# Patient Record
(demographics unavailable — no encounter records)

---

## 2019-06-14 NOTE — XRAY REPORT
Reason:  EPIGASTRIC PAIN

Procedure Date:  06/14/2019   

Accession Number:  [id number] / [id number]                    

Procedure:  XR  - Abdomen Acute CPT Code:  

 

FULL RESULT:

 

 

EXAM:

ABDOMINAL SERIES AND PA CHEST

 

EXAM DATE: 6/14/2019 12:53 PM.

 

CLINICAL HISTORY: EPIGASTRIC PAIN.

 

COMPARISON:  02/15/2016 10:42 PM

CHEST 1 VIEW 08/31/2015 2:37 AM.

 

TECHNIQUE: 2 views abdomen and 1 view chest.

 

FINDINGS:

CHEST:

Lungs/Pleura: No focal opacities. No effusion or pneumothorax.

 

Mediastinum: Within exam limitations, cardiomediastinal contour is normal.

 

ABDOMEN:

Bowel Gas Pattern: Within normal limits.  No dilated loops or abnormal 

fluid levels.

 

Free Air: None.

 

Other: Left iliac vascular stent. S-shaped thoracolumbar scoliosis

 

IMPRESSION: Unremarkable abdominal series (including 1-view chest).

 

[facility]

## 2019-06-15 NOTE — ED PHYSICIAN DOCUMENTATION
PD HPI ABD PAIN





- Stated complaint


Stated Complaint: ABD PX





- Chief complaint


Chief Complaint: Abd Pain





- History obtained from


History obtained from: Patient, Family





- History of Present Illness


Timing - onset: How many days ago (several)


Timing - duration: Days (several)


Timing - details: Abrupt onset


Pain level max: 7


Pain level now: 6


Quality: Aching, Dull, Pain


Location: Epigastric


Radiation: No: Chest, , Lower back, Left flank, Left shoulder, Right flank, 

Right shoulder, Upper back


Improved by: Other (nothing)


Worsened by: Other (nothing)


Associated symptoms: No: Fever, Nausea, Vomiting, Hematemesis, Diarrhea, 

Constipation, Melena, Hematochezia, Dysuria, Hematuria, Chest pain, Dizzy, Near 

syncope / syncope, Loss of appetite, Weight loss, Vaginal bleeding, Vaginal dc


Recently seen: Clinic (seen in clinic yesterday for same, normal acute abdominal

series)





Review of Systems


Unable to obtain: Dementia


Constitutional: denies: Fever, Chills


Throat: denies: Sore throat


Cardiac: denies: Chest pain / pressure


Respiratory: denies: Cough, Wheezing


GI: denies: Vomiting, Diarrhea, Hematemesis, Bloody / black stool


: denies: Dysuria


Skin: denies: Rash


Musculoskeletal: denies: Neck pain, Back pain


Neurologic: denies: Headache





PD PAST MEDICAL HISTORY





- Past Medical History


Cardiovascular: High cholesterol, Coronary artery disease, Peripheral Vascular 

Disease


Respiratory: Asthma


Endocrine/Autoimmune: None


GI: GERD, Hiatal hernia


GYN: None


: None


HEENT: Glaucoma


Psych: Anxiety


Musculoskeletal: Osteoporosis


Derm: None





- Past Surgical History


Past Surgical History: Yes


General: Appendectomy


Ortho: Amputation, Other


/GYN: Hysterectomy





- Present Medications


Home Medications: 


                                Ambulatory Orders











 Medication  Instructions  Recorded  Confirmed


 


Potassium Chloride 20 meq PO DAILY 08/31/15 05/17/16


 


Cholecalciferol (Vitamin D3) 1,000 unit PO DAILY 09/01/15 05/17/16





[Vitamin D3]   


 


Aspirin [Adult Low Dose Aspirin EC] 80 mg PO DAILY 02/16/16 05/17/16


 


Polyethylene Glycol 3350 [Miralax] 8.5 - 17 gm PO DAILY 02/16/16 05/17/16


 


ALPRAZolam [Alprazolam] 0.125 mg PO BID 05/17/16 05/17/16


 


Acetaminophen [Tylenol] 650 mg PO Q6H PRN 05/17/16 05/17/16


 


Alprazolam [Alprazolam Odt] 0.125 mg PO TID 05/17/16 05/17/16


 


Famotidine [Pepcid] 20 mg PO BID 05/17/16 05/17/16


 


Gabapentin 300 mg BID 05/17/16 05/17/16


 


Risperidone 1 mg BID 05/17/16 05/17/16


 


Simethicone [Anti-Gas] 60 mg PO PRN 05/17/16 


 


Travoprost (Benzalkonium) 2.5 ml OP 05/17/16 





[Travoprost 0.004% Eye Drop]   


 


oxyCODONE ER [OxyCONTIN] 10 mg PO Q12H 05/17/16 05/17/16


 


Azithromycin [Zithromax] 250 mg PO DAILY #6 tablet 10/09/16 


 


dexAMETHasone [Decadron] 4 mg PO DAILY #5 tablet 10/09/16 


 


Esomeprazole Magnesium [Nexium] 40 mg PO DAILY #30 capsule. 06/15/19 


 


Sucralfate [Carafate] 1 gm PO ACHS #60 tablet 06/15/19 


 


fentaNYL [Fentanyl 12mcg patch] 1 each TD 06/15/19 














- Allergies


Allergies/Adverse Reactions: 


                                    Allergies











Allergy/AdvReac Type Severity Reaction Status Date / Time


 


gentamicin [Gentamicin] Allergy  Unknown Verified 05/16/16 22:22


 


lisinopril Allergy  Unknown Verified 05/16/16 22:22














- Social History


Does the pt smoke?: No


Smoking Status: Former smoker


Does the pt drink ETOH?: No


Does the pt have substance abuse?: No





- Immunizations


Immunizations are current?: Yes





- POLST


Patient has POLST: Yes





PD ED PE NORMAL





- Vitals


Vital signs reviewed: Yes





- General


General: No acute distress, Well developed/nourished, Other (alert, oriented to 

)





- HEENT


HEENT: Moist mucous membranes





- Neck


Neck: Supple, no meningeal sign





- Cardiac


Cardiac: RRR, Strong equal pulses





- Respiratory


Respiratory: No respiratory distress, Clear bilaterally





- Abdomen


Abdomen: Normal bowel sounds, Soft, Non tender, Non distended





- Derm


Derm: Warm and dry





- Extremities


Extremities: Other (B AKA, no infection on skin)





- Neuro


Neuro: Other (alert)





- Psych


Psych: Normal mood, Normal affect





Results





- Vitals


Vitals: 


                               Vital Signs - 24 hr











  06/15/19 06/15/19 06/15/19





  12:40 13:00 14:30


 


Temperature 36.8 C  


 


Heart Rate 72  


 


Respiratory 18  





Rate   


 


Blood Pressure 121/93 H  


 


O2 Saturation 88 L 96 93














  06/15/19





  15:02


 


Temperature 


 


Heart Rate 66


 


Respiratory 18





Rate 


 


Blood Pressure 132/75 H


 


O2 Saturation 93








                                     Oxygen











O2 Source [Without Activity]   Room air


 


O2 Source                      Room air

















- Labs


Labs: 


                                Laboratory Tests











  06/15/19 06/15/19 06/15/19





  13:20 13:43 14:09


 


WBC  6.0  


 


RBC  4.83  


 


Hgb  13.2  


 


Hct  41.5  


 


MCV  85.9  


 


MCH  27.4  


 


MCHC  31.9 L  


 


RDW  16.4 H  


 


Plt Count  608 H  


 


MPV  10.2  


 


Neut # (Auto)  2.1  


 


Lymph # (Auto)  2.8  


 


Mono # (Auto)  0.9  


 


Eos # (Auto)  0.0  


 


Baso # (Auto)  0.2 H  


 


Absolute Nucleated RBC  0.02  


 


Nucleated RBC %  0.3  


 


Manual Slide Review  Indicated  


 


Platelet Morphology  RARE GIANT PLATELETS  


 


Sodium   141 


 


Potassium   4.0 


 


Chloride   109 


 


Carbon Dioxide   23 


 


Anion Gap   9.0 


 


BUN   14 


 


Creatinine   1.0 


 


Estimated GFR (MDRD)   65 L 


 


Glucose   103 H 


 


Calcium   9.5 


 


Total Bilirubin   0.6 


 


AST   28 


 


ALT   11 


 


Alkaline Phosphatase   109 


 


Total Protein   8.6 H 


 


Albumin   3.4 


 


Globulin   5.2 H 


 


Albumin/Globulin Ratio   0.7 L 


 


Lipase   32 


 


Urine Color    YELLOW


 


Urine Clarity    CLEAR


 


Urine pH    5.5


 


Ur Specific Gravity    1.020


 


Urine Protein    NEGATIVE


 


Urine Glucose (UA)    NEGATIVE


 


Urine Ketones    NEGATIVE


 


Urine Occult Blood    NEGATIVE


 


Urine Nitrite    NEGATIVE


 


Urine Bilirubin    NEGATIVE


 


Urine Urobilinogen    0.2 (NORMAL)


 


Ur Leukocyte Esterase    NEGATIVE


 


Ur Microscopic Review    NOT INDICATED


 


Urine Culture Comments    NOT INDICATED














PD MEDICAL DECISION MAKING





- ED course


Complexity details: reviewed old records, reviewed results, re-evaluated 

patient, considered differential, d/w patient, d/w family


ED course: 





Symptoms resolved with GI cocktail and IV Protonix.  Likely that she has 

gastritis from her iron pills.  Tolerating p.o. without difficulty here.  

Hypoxia resolved in the emergency department as well.  She is very well-

appearing, nontoxic.  Afebrile.  No evidence of bowel obstruction.  No 

peritoneal signs.  Patient and family counseled regarding signs and symptoms for

which I believe and urgent re-evaluation would be necessary. Patient with good 

understanding of and agreement to plan and is comfortable going home at this 

time





This document was made in part using voice recognition software. While efforts 

are made to proofread this document, sound alike and grammatical errors may 

occur.





Departure





- Departure


Disposition: 01 Home, Self Care


Clinical Impression: 


Gastritis


Qualifiers:


 Gastritis type: unspecified gastritis Chronicity: acute Gastritis bleeding: 

without bleeding Qualified Code(s): K29.00 - Acute gastritis without bleeding





Condition: Good


Instructions:  ED PUD Vs Gastritis


Follow-Up: 


Paulo Haddad MD [Primary Care Provider] - Within 1 week


Prescriptions: 


Esomeprazole Magnesium [Nexium] 40 mg PO DAILY #30 capsule.


Sucralfate [Carafate] 1 gm PO ACHS #60 tablet


Comments: 


Use the medications as prescribed.  Return if she worsens.  Follow-up with your 

doctor for further care.


Discharge Date/Time: 06/15/19 15:33

## 2019-09-19 NOTE — CT REPORT
Reason:  ABDOMINAL PAIN

Procedure Date:  09/18/2019   

Accession Number:  [id number] / [id number]                    

Procedure:  CT  - Abdomen/Pelvis W CPT Code:  

 

FULL RESULT:

 

 

EXAM:

CT ABDOMEN AND PELVIS

 

EXAM DATE: 9/18/2019 03:15 PM.

 

CLINICAL HISTORY: ABDOMINAL PAIN.

 

COMPARISONS: ABDOMEN ACUTE 06/14/2019 12:39 PM

CHEST 2 VIEW 06/14/2019 12:21 PM

CHEST 2 VIEW PA/LAT 10/09/2016 6:59 PM.

 

TECHNIQUE: Routine helical CT imaging was performed through the abdomen 

and pelvis. IV contrast: OPTI 320 80ML. Enteric contrast: No. 

Reconstructions: Coronal and sagittal.

 

In accordance with CT protocol optimization, one or more of the following 

dose reduction techniques were utilized for this exam: automated exposure 

control, adjustment of mA and/or KV based on patient size, or use of 

iterative reconstructive technique.

 

FINDINGS:

Lung Bases: No pleural effusion

 

Liver: Calcified granulomata. No masses.

 

Gallbladder/Bile Ducts: Unremarkable.

 

Spleen: Post splenectomy. 1.6 cm residual splenule 3/12.

 

Pancreas: Atrophic appearance. Pancreatic duct 2.8 mm

 

Adrenal Glands: Normal.

 

Kidneys: Bilateral renal cysts, the largest 3.3 cm left kidney. 1.2 cm 

round calcification left renal hilus 3/20 , question relationship to left 

renal artery

 

Peritoneal Cavity/Bowel:  No free fluid, free air or adenopathy. No 

masses or acute inflammatory process.

 

Pelvic Organs: Bladder is unremarkable. Uterus not seen.

 

Vasculature: The distal descending thoracic aorta is dilated measuring 

3.8 cm at the diaphragm with moderate mural thrombus. The celiac axis is 

occluded. Superior mesenteric artery patent. Distal 4.4 x 4.1 cm 

abdominal aortic aneurysm extending to the level of the bifurcation where 

bilateral common iliac artery grafts are in place. No evidence of 

periaortic stranding.

 

Bones: Degenerative change in the spine

 

Other: None.

IMPRESSION:

1. 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 

abdominal aorta maximal measurement 4.4 cm  distal abdominal aorta. 

Celiac axis origin appears occluded. Suggest dedicated imaging of the 

thoracic and abdominal aorta for further assessment.

2. Bilateral common iliac artery stent/grafts.

3. Status post splenectomy.

4. Bilateral renal cysts.

5. 1.2 cm round calcification left renal hilus, question relationship  to 

left renal artery.

6. Other incidental findings as above.

 

[facility]

## 2019-09-30 NOTE — CT REPORT
Reason:  ABDOMINAL AORTIC ANEURYSM, THORACIC AORTIC ANEURYS

Procedure Date:  09/29/2019   

Accession Number:  [id number] / [id number]                    

Procedure:  CT  - ANGIO CHEST W/WO CPT Code:  

 

FULL RESULT:

 

 

EXAM:

CT ANGIOGRAM CHEST

 

EXAM DATE: 09/29/2019 11:36 AM.

 

CLINICAL HISTORY: Abdominal aortic aneurysm, thoracic aortic aneurysm.

 

COMPARISON: None.

 

TECHNIQUE: Routine helical imaging was performed through the chest in the 

pulmonary arterial phase. IV Contrast: Optiray 320, 90 mL. 

Reconstructions: Coronal 3-D MIP reconstructions. Sagittal and coronal.

 

In accordance with CT protocol optimization, one or more of the following 

dose reduction techniques were utilized for this exam: automated exposure 

control, adjustment of mA and/or KV based on patient size, or use of 

iterative reconstructive technique.

 

FINDINGS:

Pulmonary Arteries:

Diagnostic quality: Adequate through the segmental arteries. No evidence 

for acute or chronic pulmonary emboli.

 

RV/LV is within normal limits. There is no interventricular septal 

bowing. There is no reflux of contrast material in the IVC.

 

Lungs/Pleura: Mild emphysematous changes with paraseptal and 

centrilobular cystic changes toward the lung apices. No lung 

consolidations identified. No bronchiectasis or pleural fluid collections.

 

Mediastinum: No cardiac enlargement or adenopathy.

 

Thoracic Aorta: Proximal abdominal aorta measured 35 x 33 mm, the aortic 

arch measured 31 x 32 mm, the mid descending thoracic aorta 28 x 29 mm, 

and the distal abdominal aorta 34 x 35 mm. Calcified mural plaques most 

conspicuous of the ascending aorta, aortic arch, and proximal descending 

aorta. Mural thickening most conspicuous of the mid to distal descending 

thoracic aorta. No aortic dissection identified.

 

Upper Abdomen: Reported separately.

 

Other: None.

IMPRESSION: Thoracic aortic prominence, with significant mural thickening 

distally, as above. No dissection identified.

 

[facility]

## 2019-09-30 NOTE — CT REPORT
Reason:  ABDOMINAL AORTIC ANEURYSM, THORACIC AORTIC ANEURYS

Procedure Date:  09/29/2019   

Accession Number:  [id number] / [id number]                    

Procedure:  CT  - ANGIO PELVIS W/WO CPT Code:  

 

FULL RESULT:

 

 

EXAM:

CT PELVIS

 

EXAM DATE: 9/29/2019 11:36 AM.

 

CLINICAL HISTORY: Abdominal aortic aneurysm, thoracic aortic aneurysm.

 

COMPARISONS: None.

 

TECHNIQUE: Routine helical CT imaging was performed through the pelvis. 

IV contrast: OPTI 320 90 mL. Enteric contrast: No. Reconstructions: 

Coronal and sagittal.

 

In accordance with CT protocol optimization, one or more of the following 

dose reduction techniques were utilized for this exam: automated exposure 

control, adjustment of mA and/or KV based on patient size, or use of 

iterative reconstructive technique.

 

FINDINGS:

Visualized Abdominal Organs: Normal.

 

Peritoneal Cavity/Bowel: No bowel obstruction identified. [place] 

radiopaque densities at the cecum, possibly pills. The appendix is not 

well seen.

 

Pelvic Organs: Uterus absent. No cystic or mass lesions in either adnexa. 

Normal-appearing urinary bladder..

 

Vasculature: Abdominal aorta findings reported separately. No aneurysm 

identified of the right common iliac artery. The left common iliac artery 

appears occluded. A vascular stent is seen. No aneurysm of the left 

common iliac artery identified.

 

Bones: No significant abnormality.

 

Other: None.

IMPRESSION: The left common iliac artery appears occluded.

 

[facility]

## 2019-09-30 NOTE — CT REPORT
Reason:  ABDOMINAL AORTIC ANEURYSM, THORACIC AORTIC ANEURYS

Procedure Date:  09/29/2019   

Accession Number:  [id number] / [id number]                    

Procedure:  CT  - ANGIO ABDOMEN W/WO CPT Code:  

 

FULL RESULT:

 

 

EXAM:

CTA ABDOMEN AND PELVIS

 

INDICATION: Thoracic and abdominal aortic aneurysm.

 

TECHNIQUE: Following intravenous administration of Optiray 320, 90 mL, 

axial sections were obtained through the abdomen and pelvis. Multiplanar 

3D reconstructions are available for interpretation.

 

In accordance with CT protocol optimization, one or more of the following 

dose reduction techniques were utilized for this exam: automated exposure 

control, adjustment of mA and/or KV based on patient size, or use of 

iterative reconstructive technique.

 

COMPARISON: CHEST ANGIO 09/29/2019 11:19 AM.

ABDOMEN ANGIO 09/29/2019 11:19 AM.

ABDOMEN/PELVIS W/ 09/18/2019 2:55 PM.

 

FINDINGS:

 

MEASUREMENTS:

At the level of the diaphragm, the aorta measures 32 x 35 mm. Previous 30 

x 34 mm.

Above the renal arteries, the aorta measures 25 x 28 mm. Previous 22 x 27 

mm.

Below the renal arteries, the aorta measures 39 x 43 mm. Previous 40 x 44 

mm.

Above the iliac bifurcation, the aorta measures 25 x 30 mm. Previous 24 x 

27 mm.

 

Lower thoracic aorta: 40 mm, stable.

 

ABDOMINAL AORTA: Moderately heavy mural calcification without dissection, 

similar to previous.

 

MESENTERIC ARTERIES: Celiac artery appears occluded at its origin. More 

distal aspect filled by collaterals. The SMA appears patent. The renal 

arteries appear patent. The GILBERTO is not seen.

 

CT ABDOMEN AND PELVIS: Visualized lung bases appear clear. No focal 

lesions detected in the visualized liver, pancreas, or adrenal glands. 

1.6 cm splenule indicated left upper abdomen. The spleen is not otherwise 

seen. Stable appearance of bilateral renal cysts. The kidneys enhance 

symmetrically. No pathologically enlarged lymph nodes.

IMPRESSION: Overall stable appearance of lower thoracic and abdominal 

aortic aneurysm. Heavy atheromatous changes. No dissection identified. 

The celiac artery appears to be occluded at its takeoff. The GILBERTO takeoff 

is also not seen.

 

[facility]

## 2019-10-13 NOTE — HISTORY & PHYSICAL EXAMINATION
Chief Complaint





- Chief Complaint


Chief Complaint: generalized weakness





<Ashwin Brice - Last Filed: 10/13/19 19:52>





History of Present Illness





- Admitted From


Admitted From:: [facility] ED





- History Obtained From


Records Reviewed: yes


History obtained from: patient's 





<Ashwin Brice - Last Filed: 10/13/19 19:52>





- History of Present Illness


HPI Comment/Other: 


Patient seen on 10/13/19 at 07:00am


Patient is an 79 y/o female with medical history significant PVD s/p bilateral 

BKA, Hyperlipidemia, asthma, mild dementia, GERD with hiatal hernia, glaucoma, 

anxiety, osteoporosis and iron deficiency anemia who presents to the ED with 

worsening weakness over the past couple days. Normally she is able to put on her

prostheses and get to the bathroom using a walker. She has not been able to do 

so lately. Yesterday she was not able to transfer to a bedside commode even with

the aid of her . She was too weak to turn. She was prescribed diamox for 

glaucoma 2 weeks ago. She has also been having a very poor appetite. As a result

she presented to the ED. Preliminary labs drawn showed a sodium of 146 and a 

chloride of 122. She was given 2L of fluid and repeat labs showed slightly more 

elevated sodium and chloride and decreased potassium. As a result she was 

presented for admission for further correction of her electrolyte imbalance.


At bedside, the patient appears very sleepy. Her  points out that she has

been sleeping longer. She was readily arousable to verbal stimuli and answered 

my questions. She denied chest pain, CHAD, abd pain, fever or chills.


She was instructed to discontinue her fentanyl patch last week by her PCP. She 

is also on lyrica and xanax.


 (Ashwin Brice)





History





- Past Medical History


Cardiovascular: reports: High cholesterol, Coronary artery disease, Peripheral 

Vascular Disease


Respiratory: reports: Asthma


Endocrine/Autoimmune: reports: None


GI: reports: GERD, Hiatal hernia


GYN: reports: None


: reports: None


HEENT: reports: Glaucoma


Psych: reports: Anxiety


Musculoskeletal: reports: Osteoporosis


Derm: reports: None


MRSA Hx?: No





- Past Surgical History


General: reports: Appendectomy


Ortho: reports: Amputation, Other


/GYN: reports: Hysterectomy





- Family & Social History


Family History: Father: CAD


Social History Notes: She lives with her  in [place]. They have been 

 for 58 years. She had a 1ppd X 15 yrs now down to 1 or 2 cigarettes 

daily. The patient denies any alcohol or illicit drug use





- POLST


Patient has POLST: Yes





<Ashwin Brice F - Last Filed: 10/13/19 19:52>





Meds/Allgy





<Jackie Murdock - Last Filed: 10/13/19 10:21>





<Ashwin Brice F - Last Filed: 10/13/19 19:52>





- Home Medications


Home Medications: 


                                Ambulatory Orders











 Medication  Instructions  Recorded  Confirmed


 


ALPRAZolam [Alprazolam] 0.25 mg PO BID 05/17/16 10/13/19


 


Baclofen 10 mg PO TID PRN 10/12/19 10/13/19


 


Divalproex Sodium 125 mg PO BID 10/12/19 10/13/19


 


Esomeprazole Magnesium 40 mg PO QDAC 10/12/19 10/13/19


 


Lovastatin 80 mg PO QPM 10/12/19 10/13/19


 


Mirtazapine 7.5 mg PO QPM 10/12/19 10/13/19


 


Potassium Chloride 20 meq PO DAILY 10/12/19 10/13/19


 


ALPRAZolam [Alprazolam] 0.25 mg PO BID 10/13/19 10/13/19


 


Aspirin [Aspirin EC] 81 mg PO DAILY 10/13/19 10/13/19


 


Brimonidine 0.2% Ophth Drops 1 drops RIGHTEYE BID 10/13/19 10/13/19





[Alphagan P 0.2% Ophth Drops]   


 


Dorzolamide HCl/Timolol Maleat 1 drops RIGHTEYE BID 10/13/19 10/13/19





[Dorzolamide-Timolol Eye Drops]   


 


Latanoprost/Pf [Latanoprost 0.005% 1 drops EACHEYE QPM 10/13/19 10/13/19





Eye Drop]   


 


Lidocaine Patch 5% [Lidoderm Patch] 2 patch TOP QPM 10/13/19 10/13/19


 


Megestrol Acetate 10 ml PO DAILY 10/13/19 10/13/19


 


Pregabalin 50 mg PO BID PRN 10/13/19 10/13/19














- Allergies


Allergies/Adverse Reactions: 


                                    Allergies











Allergy/AdvReac Type Severity Reaction Status Date / Time


 


gentamicin [Gentamicin] Allergy  Unknown Verified 05/16/16 22:22


 


lisinopril Allergy  Unknown Verified 05/16/16 22:22














Review of Systems





- Constitutional


Constitutional: reports: Weakness, Poor appetite.  denies: Fatigue, Fever





- Eyes


Eyes: reports: Other (glaucoma in the right eye).  denies: Blurred vision, 

Dipolpia





- Ears, Nose & Throat


Ears, Nose & Throat: denies: Nasal pain, Sore throat, Hoarseness





- Cardiovascular


Cariovascular: denies: Chest pain, Edema, Exertional dyspnea, Decr. exercise 

tolerance





- Respiratory


Respiratory: denies: Cough, Wheezing, SOB at rest, SOB with exertion





- Gastrointestinal


Gastrointestinal: denies: Abdominal pain, Abdominal distention, Constipation, 

Nausea, Vomiting





- Musculoskeletal


Musculoskeletal: denies: Muscle pain, Stiffness





- Integumentary


Integumentary: denies: Rash, Dryness, Pigment changes, Nail changes





- Neurological


Neurological: reports: General weakness.  denies: Focal weakness





- Psychiatric


Psychiatric: reports: Depression, Anxiety





- Endocrine


Endocrine: denies: Polyuria, Polydypsia





- Hematologic/Lymphatic


Hematologic/Lymphatic: denies: Anemia, Bruising





<Ashwin Brice F - Last Filed: 10/13/19 19:52>





<Ashwin Brice F - Last Filed: 10/13/19 19:52>


Prior Level of Functionality: 


Patient lives with  in their house. She is a double amputee. Usually she 

is able to ambulate using her prostheses and a walker. However she has been very

 weak lately. She also has poor appetite for which she was prescribed megestrol.

  She normally has a caregiver who comes in for 4-5 hours 5days a week. 


 (NcSara patel F)





Exam





- Physical Exam


General Appearance: positive: No acute distress, Other (Somnolent)


Eyes Bilateral: positive: Normal inspection, PERRL, EOMI


ENT: positive: ENT inspection nml, Pharynx nml


Neck: positive: Nml inspection, No JVD, Trachea midline


Respiratory: positive: Chest non-tender, No respiratory distress, Breath sounds 

nml.  negative: Wheezes, Rales, Rhonchi


Cardiovascular: positive: Regular rate & rhythm


Abdomen: positive: Non-tender, No organomegaly, Nml bowel sounds, No distention.

  negative: Guarding, Rebound, Hepatomegaly


Back: positive: Nml inspection


Skin: positive: Color nml, No rash, Warm, Dry


Extremities: positive: Other (bilateral BKA)


Neurologic/Psychiatric: positive: Oriented x3 (but lethargic), Weakness





<Nchotu,Niba F - Last Filed: 10/13/19 19:52>





- Vital Signs


Vital Signs: 





                                Vital Signs x48h











  Temp Pulse Resp BP Pulse Ox


 


 10/13/19 09:20     130/103 H  95


 


 10/13/19 09:06   64  18  118/65  96


 


 10/13/19 08:30   64  18  123/73  98


 


 10/13/19 07:34   66  14  128/73  99


 


 10/13/19 07:22  37.2 C  66  14  137/75 H  99


 


 10/13/19 06:45   69  18  143/83 H  99


 


 10/13/19 05:12  37.1 C    


 


 10/13/19 04:00   70  16  148/82 H  99


 


 10/13/19 02:33   75  16  127/72  97














Conclusion/Plan





- Lab Results


Fish Bones: 


                                 10/13/19 00:55





                                 10/13/19 06:20





<Jackie Murdock - Last Filed: 10/13/19 10:21>





- Problem List


(1) Electrolyte imbalance


Conclusion/Plan: 


Hypernatremia:


Diamox discontinued


D5+half NS ordered at 125ml/hr.


Will recheck BMP 





Hyperchloremia


D5+half NS ordered at 125ml/hr.


Will recheck BMP





Hypokalemia


Will replace with 60mEq of potassium citrate 








(2) Weakness


Conclusion/Plan: 


Possibly multifactorial


?2/2 Electrolyte imbalance, Medication, Poor oral intake


Hydrating patient and replacing potassium


Patient on megestrol for appetite. Will continue


Will hold lyrica, baclofen and alprazolam


Patient had fentanyl patch discontinued 4 days ago.





Social work to see patient and assist with an appropriate disposition.


The patient normally has caregivers for 4-5hr during weak days











(3) Glaucoma


Conclusion/Plan: 


On latanoprost, dorzolamide/ timolol.


Will continue when verified








(4) Hyperlipidemia


Conclusion/Plan: 


On lovastatin








(5) GERD (gastroesophageal reflux disease)


Conclusion/Plan: 


Protonix ordered








(6) Depression


Conclusion/Plan: 


On remeron








- Lab Results


Fish Bones: 


                                 10/13/19 00:55





                                 10/13/19 06:20





<Ashwin Brice F - Last Filed: 10/13/19 19:52>





Core Measures





- Anticipated LOS


I expect patient to be DC'd or transferred within 96 hours.: Yes





- DVT/VTE - Prophylaxis


VTE/DVT Device ordered at admit?: Yes





<Ashwin Brice F - Last Filed: 10/13/19 19:52>

## 2019-10-13 NOTE — ED PHYSICIAN DOCUMENTATION
History of Present Illness





- Stated complaint


Stated Complaint: WEAKNESS





- Chief complaint


Chief Complaint: Neuro





- History obtained from


History obtained from: Patient, Family, EMS





- History of Present Illness


Timing: How many days ago (2)





- Additonal information


Additional information: 





80-year-old female with history of severe peripheral vascular disease who is 

status post bilateral BKA and has a history of vascular dementia is being cared 

for by her  at home.  Over the past 2 weeks the patient has been placed 

on Diamox for elevated pressure in the right eye.  She has become profoundly 

weak and the patient's  is unable to care for her at home.  She is 

normally able to sit up, put her prostheses on and transfer to the bedside 

commode.  She is not able to sit up in bed today.





Review of Systems


Constitutional: denies: Fever


Eyes: reports: Decreased vision


Ears: denies: Ear pain


Nose: denies: Congestion


Throat: denies: Sore throat


Cardiac: denies: Chest pain / pressure, Palpitations


Respiratory: denies: Dyspnea, Cough


GI: reports: Abdominal Pain.  denies: Nausea, Vomiting


: denies: Dysuria, Frequency


Skin: denies: Rash


Musculoskeletal: reports: Back pain.  denies: Neck pain


Neurologic: reports: Generalized weakness.  denies: Focal weakness, Numbness





PD PAST MEDICAL HISTORY





- Past Medical History


Past Medical History: Yes


Cardiovascular: High cholesterol, Coronary artery disease, Peripheral Vascular 

Disease


Respiratory: Asthma


Endocrine/Autoimmune: None


GI: GERD, Hiatal hernia


GYN: None


: None


HEENT: Glaucoma


Psych: Anxiety


Musculoskeletal: Osteoporosis


Derm: None





- Past Surgical History


Past Surgical History: Yes


General: Appendectomy


Ortho: Amputation, Other


/GYN: Hysterectomy





- Present Medications


Home Medications: 


                                Ambulatory Orders











 Medication  Instructions  Recorded  Confirmed


 


Potassium Chloride 20 meq PO DAILY 08/31/15 05/17/16


 


Cholecalciferol (Vitamin D3) 1,000 unit PO DAILY 09/01/15 05/17/16





[Vitamin D3]   


 


Aspirin [Adult Low Dose Aspirin EC] 80 mg PO DAILY 02/16/16 05/17/16


 


Polyethylene Glycol 3350 [Miralax] 8.5 - 17 gm PO DAILY 02/16/16 05/17/16


 


ALPRAZolam [Alprazolam] 0.25 mg PO BID 05/17/16 05/17/16


 


Acetaminophen [Tylenol] 650 mg PO Q6H PRN 05/17/16 05/17/16


 


Alprazolam [Alprazolam Odt] 0.25 mg PO RTQ4H 05/17/16 05/17/16


 


Famotidine [Pepcid] 20 mg PO BID 05/17/16 05/17/16


 


Gabapentin 300 mg BID 05/17/16 05/17/16


 


Risperidone 1 mg BID 05/17/16 05/17/16


 


Simethicone [Anti-Gas] 60 mg PO PRN 05/17/16 


 


Travoprost (Benzalkonium) 2.5 ml OP 05/17/16 





[Travoprost 0.004% Eye Drop]   


 


oxyCODONE ER [OxyCONTIN] 10 mg PO Q12H 05/17/16 05/17/16


 


Esomeprazole Magnesium [Nexium] 40 mg PO DAILY #30 capsule. 06/15/19 


 


Sucralfate [Carafate] 1 gm PO ACHS #60 tablet 06/15/19 


 


Baclofen 10 mg PO TID PRN 10/12/19 


 


Divalproex Sodium 125 mg PO BID 10/12/19 


 


Esomeprazole Magnesium 40 mg PO DAILY 10/12/19 


 


Lovastatin 80 mg PO DAILY 10/12/19 


 


Mirtazapine 0.5 tab PO DAILY 10/12/19 


 


Potassium Chloride 20 meq PO DAILY 10/12/19 














- Allergies


Allergies/Adverse Reactions: 


                                    Allergies











Allergy/AdvReac Type Severity Reaction Status Date / Time


 


gentamicin [Gentamicin] Allergy  Unknown Verified 05/16/16 22:22


 


lisinopril Allergy  Unknown Verified 05/16/16 22:22














- Social History


Does the pt smoke?: No


Smoking Status: Never smoker


Does the pt drink ETOH?: No


Does the pt have substance abuse?: No





- Immunizations


Immunizations are current?: Yes





- POLST


Patient has POLST: Yes





PD ED PE NORMAL





- Vitals


Vital signs reviewed: Yes (hypertensive mild )





- General


General: No acute distress, Well developed/nourished, Other (oriented to person 

only )





- HEENT


HEENT: Atraumatic





- Neck


Neck: Supple, no meningeal sign, No bony TTP





- Cardiac


Cardiac: RRR, No murmur





- Respiratory


Respiratory: No respiratory distress, Clear bilaterally





- Abdomen


Abdomen: Soft, Non tender





- Back


Back: No CVA TTP, No spinal TTP





- Derm


Derm: Normal color, Warm and dry, No rash





- Extremities


Extremities: Other (bilat  BKA)





- Neuro


Neuro: CNs 2-12 intact, No motor deficit, No sensory deficit, Normal speech


Eye Opening: Spontaneous


Motor: Obeys Commands


Verbal: Confused


GCS Score: 14





- Psych


Psych: Other (mood is withdrawn and affect is flat)





Results





- Vitals


Vitals: 


                               Vital Signs - 24 hr











  10/12/19 10/13/19 10/13/19





  23:05 01:12 02:33


 


Temperature 36.8 C  


 


Heart Rate 78 82 75


 


Respiratory 16 15 16





Rate   


 


Blood Pressure 124/81 H 108/62 127/72


 


O2 Saturation 99 94 97














  10/13/19 10/13/19 10/13/19





  04:00 05:12 06:45


 


Temperature  37.1 C 


 


Heart Rate 70  69


 


Respiratory 16  18





Rate   


 


Blood Pressure 148/82 H  143/83 H


 


O2 Saturation 99  99














  10/13/19





  07:22


 


Temperature 37.2 C


 


Heart Rate 66


 


Respiratory 14





Rate 


 


Blood Pressure 137/75 H


 


O2 Saturation 99








                                     Oxygen











O2 Source []                   Room air


 


O2 Source                      Room air

















- Labs


Labs: 


                                Laboratory Tests











  10/13/19 10/13/19 10/13/19





  00:55 01:13 02:20


 


WBC  7.0  


 


RBC  4.35  


 


Hgb  12.1  


 


Hct  39.8  


 


MCV  91.5  


 


MCH  27.8  


 


MCHC  30.4 L  


 


RDW  20.6 H  


 


Plt Count  240  


 


MPV  12.0 H  


 


Neut # (Auto)  3.3  


 


Lymph # (Auto)  3.0  


 


Mono # (Auto)  0.7  


 


Eos # (Auto)  0.0  


 


Baso # (Auto)  0.0  


 


Absolute Nucleated RBC  0.05  


 


Nucleated RBC %  0.7  


 


Manual Slide Review  Indicated  


 


WBC Morphology  NORMAL APPEARANCE  


 


Platelet Estimate  NORMAL (130-450,000)  


 


Platelet Morphology  PLATELET CLUMPING  


 


RBC Morph Micro Appear  2+  ANISOCYTOSIS  


 


Sodium    146 H


 


Potassium    3.5


 


Chloride    122 H*


 


Carbon Dioxide    16 L


 


Anion Gap    8.0


 


BUN    13


 


Creatinine    1.0


 


Estimated GFR (MDRD)    65 L


 


Glucose    82


 


Calcium    8.7


 


Total Bilirubin    0.7


 


AST    18


 


ALT    < 10 L


 


Alkaline Phosphatase    95


 


Total Protein    8.1


 


Albumin    2.8 L


 


Globulin    5.3 H


 


Albumin/Globulin Ratio    0.5 L


 


Lipase    30


 


Urine Color   YELLOW 


 


Urine Clarity   CLEAR 


 


Urine pH   5.5 


 


Ur Specific Gravity   1.020 


 


Urine Protein   NEGATIVE 


 


Urine Glucose (UA)   NEGATIVE 


 


Urine Ketones   NEGATIVE 


 


Urine Occult Blood   NEGATIVE 


 


Urine Nitrite   NEGATIVE 


 


Urine Bilirubin   NEGATIVE 


 


Urine Urobilinogen   1 (NORMAL) 


 


Ur Leukocyte Esterase   SMALL H 


 


Urine RBC   None Seen 


 


Urine WBC   4-5 


 


Ur Squamous Epith Cells   FEW Squamous 


 


Urine Bacteria   Few 


 


Ur Microscopic Review   INDICATED 


 


Urine Culture Comments   INDICATED 














  10/13/19





  06:20


 


WBC 


 


RBC 


 


Hgb 


 


Hct 


 


MCV 


 


MCH 


 


MCHC 


 


RDW 


 


Plt Count 


 


MPV 


 


Neut # (Auto) 


 


Lymph # (Auto) 


 


Mono # (Auto) 


 


Eos # (Auto) 


 


Baso # (Auto) 


 


Absolute Nucleated RBC 


 


Nucleated RBC % 


 


Manual Slide Review 


 


WBC Morphology 


 


Platelet Estimate 


 


Platelet Morphology 


 


RBC Morph Micro Appear 


 


Sodium  147 H


 


Potassium  3.2 L


 


Chloride  124 H*


 


Carbon Dioxide  17 L


 


Anion Gap  6.0


 


BUN  11


 


Creatinine  0.9


 


Estimated GFR (MDRD)  73 L


 


Glucose  89


 


Calcium  8.4 L


 


Total Bilirubin 


 


AST 


 


ALT 


 


Alkaline Phosphatase 


 


Total Protein 


 


Albumin 


 


Globulin 


 


Albumin/Globulin Ratio 


 


Lipase 


 


Urine Color 


 


Urine Clarity 


 


Urine pH 


 


Ur Specific Gravity 


 


Urine Protein 


 


Urine Glucose (UA) 


 


Urine Ketones 


 


Urine Occult Blood 


 


Urine Nitrite 


 


Urine Bilirubin 


 


Urine Urobilinogen 


 


Ur Leukocyte Esterase 


 


Urine RBC 


 


Urine WBC 


 


Ur Squamous Epith Cells 


 


Urine Bacteria 


 


Ur Microscopic Review 


 


Urine Culture Comments 














- Rads (name of study)


  ** chest


Radiology: Prelim report reviewed (Impression:Stable appearance of the chest 

without acute cardiopulmonary ), EMP read indepedently, See rad report





Procedures





- IVC sono (time)


  ** 2320


Bedside IVC sono: IVC measures (cm) (0.71), IVC collapsed c insp (cm) 

(complete), Profound dehydration (est 3 liter deficit)





PD MEDICAL DECISION MAKING





- ED course


Complexity details: reviewed old records, reviewed results, re-evaluated 

patient, considered differential, d/w patient, d/w family


ED course: 





80-year-old female with severe peripheral vascular disease and vascular dementia

 has had a problem with pressure in her right eye and has been placed on some 

Diamox and she has become profoundly dehydrated.  She has become weak enough 

that her  is unable to care for her at home she is able to sit up in bed 

by herself and she is brought to the hospital for further evaluation.  Here in 

the emergency department the patient is administered intravenous saline and she 

is given 2 L and despite this her strength does not return immediately.  She is 

still too weak to sit up in bed. She will require more fluid over a longer 

period of time to compensate. She has advanced peripheral vascular disease and 

her recovery may be prolonged or worse. I did discuss with her  the 

possibility that she would not recover and he indicates that there are advance 

directives indicating DNR.  We attempted to admit this patient to the hospital 

and she will need further care in the ED. Her initial electrolytes were obtained

 after 2 liters of saline were administered and showed hypernatremia. Her fluid 

is changed to D5W at 200ml/hr and the  is consulted. At shift 

change care is turned over to Dr. Murdock. 





Departure





- Departure


Clinical Impression: 


 Dehydration, Altered mental status, Weakness





Condition: Poor

## 2019-10-13 NOTE — XRAY REPORT
Reason:  weakness

Procedure Date:  10/13/2019   

Accession Number:  [id number] / [id number]                    

Procedure:  XR  - Chest 1 View X-Ray CPT Code:  11992

 

FULL RESULT:

 

 

EXAM:

CHEST RADIOGRAPHY

 

EXAM DATE: 10/12/2019 11:45 PM.

 

CLINICAL HISTORY: Weakness.

 

COMPARISON: CHEST 2 VIEW 06/14/2019 12:21 PM

CHEST 2 VIEW PA/LAT 10/09/2016 6:59 PM

 02/15/2016 6:18 PM

CHEST 1 VIEW 08/31/2015 2:37 AM

CHEST ANGIO 09/29/2019 11:21 AM.

 

TECHNIQUE: 1 view.

 

FINDINGS:

Lungs/Pleura: No significant consolidation, effusion, or definite 

pneumothorax.

 

Mediastinum: Small calcified left hilar lymph node. Cardiac silhouette is 

within normal limits when accounting for lung volumes and technique.

 

Other: None.

 

IMPRESSION: Stable appearance of the chest without acute cardiopulmonary 

abnormality.

 

RADIA

## 2019-10-13 NOTE — XRAY REPORT
Reason:  chest pain

Procedure Date:  10/13/2019   

Accession Number:  [id number] / [id number]                    

Procedure:  XR  - Chest 1 View X-Ray CPT Code:  62780

 

FULL RESULT:

 

 

EXAM:

CHEST RADIOGRAPHY

 

EXAM DATE: 10/13/2019 10:23 AM.

 

CLINICAL HISTORY: Chest pain.

 

COMPARISON: CHEST 1 VIEW 10/12/2019 11:45 PM

CHEST ANGIO 09/29/2019 11:21 AM.

 

TECHNIQUE: 1 view.

 

FINDINGS:

Lungs/Pleura: No focal opacities evident. No pleural effusion. No 

pneumothorax.

 

Mediastinum: Tortuous partially calcified aorta is seen. Slight 

aneurysmal dilatation of the descending portion is again noted.

 

Other: None.

 

IMPRESSION: No acute findings.

 

RADIA

## 2019-10-14 NOTE — PROVIDER PROGRESS NOTE
Assessment/Plan





- Problem List


(1) UTI (urinary tract infection)


Qualifiers: 


   Urinary tract infection type: acute cystitis   Hematuria presence: without 

hematuria   Qualified Code(s): N30.00 - Acute cystitis without hematuria   


Assessment/Plan: 


The admission urinalysis was abnormal and culture was indicated.


The preliminary urine culture is already growing gram-negative bacteria.


A UTI may have been the reason for the progressive weakness over the past 

several days.


Will change her from Observation to full Inpatient admission, she will need at 

least 2 days of IV antibiotics.








(2) Wheezing


Assessment/Plan: 


The patient's chart indicates that she has asthma, and the patient nods her head

Yes when I asked her; the  however did not know this diagnosis.


We will start nebulizers.


We will order BNP and stop her IV fluids








(3) Dehydration


Assessment/Plan: 


Improved, her oral mucosa is moist, labs have normalized








(4) Weakness


Assessment/Plan: 


The  reports that she is still not at her baseline of being able to put 

on her prostheses herself, get out of bed herself and ambulate


Start PT today


SW to see regarding more hours of home assistance possibly








(5) S/P BKA (below knee amputation) bilateral


Assessment/Plan: 


Chronic and well tolerated








(6) Dementia


Assessment/Plan: 


The  reports that her Dx is "Vascular Dementia" but that normally she is 

very functional, plays along with TV game shows even.








(7) Hypokalemia


Assessment/Plan: 


Related to saline and free water hydration without K.


Replace K 


Follow BMP daily








(8) Hypernatremia


Assessment/Plan: 


Resolved








- Current Meds


Current Meds: 





                               Current Medications











Generic Name Dose Route Start Last Admin





  Trade Name Freq  PRN Reason Stop Dose Admin


 


Albuterol/Ipratropium  3 ml  10/14/19 11:22  10/14/19 11:43





  Duoneb  INH   3 ml





  Q4HR PRN   Administration





  Wheezing   





     





     





     


 


Aspirin  81 mg  10/14/19 12:00  10/14/19 13:45





  Ecotrin  PO   81 mg





  DAILY KATHY   Administration





     





     





     





     


 


Megestrol Acetate  400 mg  10/14/19 12:00  10/14/19 13:45





  Megace  PO   400 mg





  DAILY KATHY   Administration





     





     





     





     


 


Pantoprazole Sodium  40 mg  10/13/19 08:00  10/14/19 06:09





  Protonix  PO   40 mg





  QDAC KATHY   Administration





     





     





     





     


 


Brimonidine 0.2%  1 each  10/13/19 21:00  10/14/19 21:08





  Ophthalmic Drops  RIGHTEYE   1 each





  BID KATHY   Administration





     





     





     





     


 


Dorzolamide 2%/  1 each  10/13/19 21:00  10/14/19 21:15





Timolol 0.5%  RIGHTEYE   1 each





Ophthalmic Drops  BID KATHY   Administration





     





     





     





     


 


Latanoprost 0.005%  1 each  10/13/19 21:00  10/14/19 21:20





  Ophthalmic Drops  EACHEYE   1 each





  QPM KATHY   Administration





     





     





     





     


 


Divalproex Dr 125 Mg  1 each  10/14/19 13:00  10/14/19 21:12





  Tablet  PO   1 each





  BID KATHY   Administration





     





     





     





     


 


Polyethylene Glycol  17 gm  10/13/19 09:00  10/14/19 08:13





  Miralax  PO   Not Given





  DAILY KATHY   





     





     





     





     


 


Sodium Chloride  10 ml  10/13/19 09:00  10/14/19 16:26





  Normal Saline Flush 0.9%  IVP   10 ml





  0100,0900,1700 KATHY   Administration





     





     





     





     














- Lab Result


Fish Bone Diagrams: 


                                 10/15/19 07:26





                                 10/15/19 07:26





- Additional Planning


My Orders: 





My Active Orders





10/13/19 21:00


Patient Own Med [Patient Own Medication]   1 each EACHEYE QPM 


Patient Own Med [Patient Own Medication]   1 each RIGHTEYE BID 


Patient Own Med [Patient Own Medication]   1 each RIGHTEYE BID 





10/14/19


Evaluate and Treat OT [OT] Routine 


Evaluate and Treat PT [PT] Routine 





10/14/19 11:09


Baclofen [Lioresal]   10 mg PO TID PRN 





10/14/19 11:22


Nebulizer/MDI Tx. [RC] .Q4 PRN 


Ipratropium/Albuterol [Duoneb]   3 ml INH Q4HR PRN 





10/14/19 12:00


Aspirin EC [Ecotrin]   81 mg PO DAILY 


Megestrol [Megace]   400 mg PO DAILY 





10/14/19 13:00


Patient Own Med [Patient Own Medication]   1 each PO BID 














Subjective





- Subjective


Patient Reports: Resting Comfortably


Nursing Reports: Confused





Objective


Vital Signs: 





                               Vital Signs - 24 hr











  10/13/19 10/14/19 10/14/19





  23:30 03:20 07:28


 


Temperature 36.7 C 36.9 C 38.0 C H


 


Heart Rate   


 


Heart Rate [ 66 68 107 H





Brachial]   


 


Heart Rate [   





Supine]   


 


Respiratory 16 16 20





Rate   


 


Respiratory   





Rate [Without   





Activity]   


 


Blood Pressure   





[Left Brachial   





artery]   


 


Blood Pressure 137/76 H 155/78 H 





[Right Brachial   





artery]   


 


Blood Pressure   174/144 H





[Right Radial   





artery]   


 


O2 Saturation 93 98 84 L


 


O2 Saturation [   





Without   





Activity]   














  10/14/19 10/14/19 10/14/19





  08:51 11:43 15:00


 


Temperature   37.4 C


 


Heart Rate  89 


 


Heart Rate [   81





Brachial]   


 


Heart Rate [   





Supine]   


 


Respiratory  20 11 L





Rate   


 


Respiratory   





Rate [Without   





Activity]   


 


Blood Pressure   153/71 H





[Left Brachial   





artery]   


 


Blood Pressure 144/103 H  





[Right Brachial   





artery]   


 


Blood Pressure   





[Right Radial   





artery]   


 


O2 Saturation   96


 


O2 Saturation [   





Without   





Activity]   














  10/14/19 10/14/19 10/14/19





  15:35 16:37 20:01


 


Temperature 37.4 C  37.3 C


 


Heart Rate   


 


Heart Rate [ 79  79





Brachial]   


 


Heart Rate [  76 





Supine]   


 


Respiratory 16  16





Rate   


 


Respiratory  16 





Rate [Without   





Activity]   


 


Blood Pressure   





[Left Brachial   





artery]   


 


Blood Pressure   





[Right Brachial   





artery]   


 


Blood Pressure 146/107 H  133/83 H





[Right Radial   





artery]   


 


O2 Saturation 97  96


 


O2 Saturation [  86 L 





Without   





Activity]   








                                     Oxygen











O2 Source [Without Activity]   Room air


 


O2 Source                      Room air














I&O (Last 24 Hrs): 





                          Intake and Output Totals x24h











 10/12/19 10/13/19 10/14/19





 23:59 23:59 23:59


 


Intake Total  4280 3000


 


Output Total  750 3100


 


Balance  3530 -100











General: Alert


HEENT: Mucous membr. moist/pink


Neck: Supple, No JVD


Neuro: Disoriented


Cardiovascular: Regular rate, No murmurs


Respiratory: No respiratory distress, Breath sounds nml


Abdomen: Soft


Extremities: Other (Bilat BKAs)





- Results


Results: 





                               Laboratory Results











WBC  7.4 x10^3/uL (4.8-10.8)   10/14/19  05:29    


 


RBC  4.91 10^6/uL (4.20-5.40)   10/14/19  05:29    


 


Hgb  13.4 g/dL (12.0-16.0)   10/14/19  05:29    


 


Hct  43.3 % (37.0-47.0)   10/14/19  05:29    


 


MCV  88.2 fL (81.0-99.0)   10/14/19  05:29    


 


MCH  27.3 pg (27.0-31.0)   10/14/19  05:29    


 


MCHC  30.9 g/dL (32.0-36.0)  L  10/14/19  05:29    


 


RDW  20.6 % (12.0-15.0)  H  10/14/19  05:29    


 


Plt Count  316 10^3/uL (130-450)   10/14/19  05:29    


 


MPV  11.8 fL (7.9-10.8)  H  10/14/19  05:29    


 


Neut # (Auto)  3.7 10^3/uL (1.5-6.6)   10/14/19  05:29    


 


Lymph # (Auto)  2.7 10^3/uL (1.5-3.5)   10/14/19  05:29    


 


Mono # (Auto)  0.6 10^3/uL (0.0-1.0)   10/14/19  05:29    


 


Eos # (Auto)  0.2 10^3/uL (0.0-0.7)   10/14/19  05:29    


 


Baso # (Auto)  0.0 10^3/uL (0.0-0.1)   10/14/19  05:29    


 


Absolute Nucleated RBC  0.05 x10^3/uL  10/14/19  05:29    


 


Nucleated RBC %  0.7 /100WBC  10/14/19  05:29    


 


Manual Slide Review  Indicated   10/13/19  00:55    


 


WBC Morphology  NORMAL APPEARANCE  (NORMAL)   10/13/19  00:55    


 


Platelet Estimate  NORMAL (130-450,000)  (NORMAL)   10/13/19  00:55    


 


Platelet Morphology  PLATELET CLUMPING  (NORMAL)   10/13/19  00:55    


 


RBC Morph Micro Appear  2+  ANISOCYTOSIS  (NORMAL)   10/13/19  00:55    


 


Sodium  143 mmol/L (135-145)   10/14/19  05:29    


 


Potassium  4.0 mmol/L (3.5-5.0)   10/14/19  05:29    


 


Chloride  116 mmol/L (101-111)  H  10/14/19  05:29    


 


Carbon Dioxide  18 mmol/L (21-32)  L  10/14/19  05:29    


 


Anion Gap  9.0  (6-13)   10/14/19  05:29    


 


BUN  5 mg/dL (6-20)  L  10/14/19  05:29    


 


Creatinine  0.8 mg/dL (0.4-1.0)   10/14/19  05:29    


 


Estimated GFR (MDRD)  84  (>89)  L  10/14/19  05:29    


 


Glucose  115 mg/dL ()  H  10/14/19  05:29    


 


Calcium  9.2 mg/dL (8.5-10.3)   10/14/19  05:29    


 


Phosphorus  2.8 mg/dL (2.5-4.6)   10/13/19  06:25    


 


Magnesium  1.8 mg/dL (1.7-2.8)   10/13/19  06:25    


 


Total Bilirubin  0.7 mg/dL (0.2-1.0)   10/13/19  02:20    


 


AST  18 IU/L (10-42)   10/13/19  02:20    


 


ALT  < 10 IU/L (10-60)  L  10/13/19  02:20    


 


Alkaline Phosphatase  95 IU/L ()   10/13/19  02:20    


 


B-Natriuretic Peptide  258 pg/mL (5-100)  H  10/14/19  06:00    


 


Total Protein  8.1 g/dL (6.7-8.2)   10/13/19  02:20    


 


Albumin  2.8 g/dL (3.2-5.5)  L  10/13/19  02:20    


 


Globulin  5.3 g/dL (2.1-4.2)  H  10/13/19  02:20    


 


Albumin/Globulin Ratio  0.5  (1.0-2.2)  L  10/13/19  02:20    


 


Lipase  30 U/L (22-51)   10/13/19  02:20    


 


Urine Color  YELLOW   10/13/19  01:13    


 


Urine Clarity  CLEAR  (CLEAR)   10/13/19  01:13    


 


Urine pH  5.5 PH (5.0-7.5)   10/13/19  01:13    


 


Ur Specific Gravity  1.020  (1.002-1.030)   10/13/19  01:13    


 


Urine Protein  NEGATIVE mg/dL (NEGATIVE)   10/13/19  01:13    


 


Urine Glucose (UA)  NEGATIVE mg/dL (NEGATIVE)   10/13/19  01:13    


 


Urine Ketones  NEGATIVE mg/dL (NEGATIVE)   10/13/19  01:13    


 


Urine Occult Blood  NEGATIVE  (NEGATIVE)   10/13/19  01:13    


 


Urine Nitrite  NEGATIVE  (NEGATIVE)   10/13/19  01:13    


 


Urine Bilirubin  NEGATIVE  (NEGATIVE)   10/13/19  01:13    


 


Urine Urobilinogen  1 (NORMAL) E.U./dL (NORMAL)   10/13/19  01:13    


 


Ur Leukocyte Esterase  SMALL  (NEGATIVE)  H  10/13/19  01:13    


 


Urine RBC  None Seen /HPF (0-5)   10/13/19  01:13    


 


Urine WBC  4-5 /HPF (0-5)   10/13/19  01:13    


 


Ur Squamous Epith Cells  FEW Squamous  (<= Few)   10/13/19  01:13    


 


Urine Bacteria  Few /HPF (None Seen)   10/13/19  01:13    


 


Ur Microscopic Review  INDICATED   10/13/19  01:13    


 


Urine Culture Comments  INDICATED   10/13/19  01:13    














- Procedures


Procedures: 





Procedures





CENTRAL VENOUS CATHETER PLACEMENT WITH GUIDANCE (08/31/15)


SPINAL TAP (08/31/15)

## 2019-10-15 NOTE — DISCHARGE SUMMARY
Discharge Summary


Admit Date: 10/13/19


Discharge Date: 10/15/19


Discharging Provider: OSIEL SEPULVEDA


Primary Care Provider: Dr. Ingram


Condition at Discharge: Stable


Discharge Disposition: 06 Home Health Service


Discharge Facility Name: home





- DIAGNOSES


Admission Diagnoses: 


(1) Electrolyte imbalance





(2) Weakness





(3) Glaucoma





(4) Hyperlipidemia





(5) GERD (gastroesophageal reflux disease)





(6) Depression








Discharge Diagnoses with Status of Each Condition: 


(1) UTI (urinary tract infection)


UA culture reveal three bacterial pseudomonas aeruglnosa, (after d/c culture 

reveals)Ecoli, and staphylococcus Ssp. urealy. All bacterial are sensitive to 

Cipro, pt was prescribed Cipro


(2) Wheezing


resolved. lung sound clear, pt had 97% sats on room air


(3) Dehydration


resolved


(4) Weakness


pt was evaluated and treated by PT/OT, pt was arranged home health PT for 

continuing training.


(5) S/P BKA (below knee amputation) bilateral


stable. pt was arranged for home AIDS


(6) Dementia


stable


(7) Hypokalemia


placed


(8) Hypernatremia


resolved








- HPI


History of Present Illness: 


refer from Dr. Brice's HPI on 10/13/19


Patient seen on 10/13/19 at 07:00am


Patient is an 81 y/o female with medical history significant PVD s/p bilateral 

BKA, Hyperlipidemia, asthma, mild dementia, GERD with hiatal hernia, glaucoma, 

anxiety, osteoporosis and iron deficiency anemia who presents to the ED with 

worsening weakness over the past couple days. Normally she is able to put on her

prostheses and get to the bathroom using a walker. She has not been able to do 

so lately. Yesterday she was not able to transfer to a bedside commode even with

the aid of her . She was too weak to turn. She was prescribed diamox for 

glaucoma 2 weeks ago. She has also been having a very poor appetite. As a result

she presented to the ED. Preliminary labs drawn showed a sodium of 146 and a 

chloride of 122. She was given 2L of fluid and repeat labs showed slightly more 

elevated sodium and chloride and decreased potassium. As a result she was 

presented for admission for further correction of her electrolyte imbalance.


At bedside, the patient appears very sleepy. Her  points out that she has

been sleeping longer. She was readily arousable to verbal stimuli and answered 

my questions. She denied chest pain, CHAD, abd pain, fever or chills.


She was instructed to discontinue her fentanyl patch last week by her PCP. She 

is also on lyrica and xanax.








- HOSPITAL COURSE


Hospital Course: 


pt was admitted for generalized weakness. pt was found to have UTI. pt was 

treated with antibiotics. pt was evaluated and treated with PT/OT. after 

treatment, pt state she felt much better. pt was arranged for home health PT and

home AIDE. pt was prescribed antibiotics Cipro for d/c. pt was also arranged by 

 for nurse home long term care. pt and her  was happy to be 

d/c home.


The detail hospital course is as the below


1) UTI (urinary tract infection)


UA culture reveal three bacterial pseudomonas aeruglnosa, (after d/c culture 

reveals)Ecoli, and staphylococcus Ssp. urealy. All bacterial are sensitive to 

Cipro, pt was prescribed Cipro


(2) Wheezing


resolved. lung sound clear, pt had 97% sats on room air


(3) Dehydration


resolved


(4) Weakness


pt was evaluated and treated by PT/OT, pt was arranged home health PT for 

continuing training.


(5) S/P BKA (below knee amputation) bilateral


stable. pt was arranged for home AIDS


(6) Dementia


stable


(7) Hypokalemia


placed


(8) Hypernatremia


resolved








- ALLERGIES


Allergies/Adverse Reactions: 


                                    Allergies











Allergy/AdvReac Type Severity Reaction Status Date / Time


 


gentamicin [Gentamicin] Allergy  Unknown Verified 05/16/16 22:22


 


lisinopril Allergy  Unknown Verified 05/16/16 22:22














- MEDICATIONS


Home Medications: 


                                Ambulatory Orders











 Medication  Instructions  Recorded  Confirmed


 


ALPRAZolam [Alprazolam] 0.25 mg PO BID 05/17/16 10/13/19


 


Baclofen 10 mg PO TID PRN 10/12/19 10/13/19


 


Divalproex Sodium 125 mg PO BID 10/12/19 10/13/19


 


Esomeprazole Magnesium 40 mg PO QDAC 10/12/19 10/13/19


 


Lovastatin 80 mg PO QPM 10/12/19 10/13/19


 


Mirtazapine 7.5 mg PO QPM 10/12/19 10/13/19


 


Potassium Chloride 20 meq PO DAILY 10/12/19 10/13/19


 


ALPRAZolam [Alprazolam] 0.25 mg PO BID 10/13/19 10/13/19


 


Aspirin [Aspirin EC] 81 mg PO DAILY 10/13/19 10/13/19


 


Brimonidine 0.2% Ophth Drops 1 drops RIGHTEYE BID 10/13/19 10/13/19





[Alphagan P 0.2% Ophth Drops]   


 


Dorzolamide HCl/Timolol Maleat 1 drops RIGHTEYE BID 10/13/19 10/13/19





[Dorzolamide-Timolol Eye Drops]   


 


Latanoprost/Pf [Latanoprost 0.005% 1 drops EACHEYE QPM 10/13/19 10/13/19





Eye Drop]   


 


Lidocaine Patch 5% [Lidoderm Patch] 2 patch TOP QPM 10/13/19 10/13/19


 


Megestrol Acetate 400 mg PO DAILY 10/13/19 10/14/19


 


Pregabalin 50 mg PO BID PRN 10/13/19 10/13/19


 


Ciprofloxacin HCl [Cipro] 500 mg PO BID #14 tablet 10/15/19 














- PHYSICAL EXAM AT DISCHARGE


General Appearance: positive: No acute distress, Alert.  negative: Lethargic


Eyes Bilateral: positive: Normal inspection, PERRL, No lid inflammation, 

Conjunctivae nml


ENT: positive: ENT inspection nml, Pharynx nml, No signs of dehydration.  

negative: Purulent nasal drainage, Pharyngeal erythema, Oral lesions


Neck: positive: Nml inspection, Thyroid nml, No JVD, Trachea midline.  negative:

 Thyromegaly, Lymphadenopathy (R), Lymphadenopathy (L), Stiff neck, Swelling

/bruising, Tracheal deviation


Respiratory: positive: Chest non-tender, No respiratory distress, Breath sounds 

nml.  negative: Wheezes, Rales, Rhonchi


Cardiovascular: positive: Regular rate & rhythm, No murmur, No gallop.  

negative: Irregularly irregular, Extrasystoles, Tachycardia, Bradycardia, PMI 

displaced laterally


Peripheral Pulses: positive: 2+


Abdomen: positive: Non-tender, No organomegaly, Nml bowel sounds, No distention.

  negative: Tenderness, Guarding, Rebound


Back: positive: Nml inspection


Skin: positive: Color nml, No rash, Warm, Dry.  negative: Cyanosis, Diaphoresis,

 Pallor


Extremities: positive: Non-tender


Neurologic/Psychiatric: positive: Oriented x3, Sensation nml.  negative: 

Weakness, Sensory loss, Facial droop, Slurred/abnml speech, Depressed 

mood/affect





- LABS


Result Diagrams: 


                                 10/15/19 07:26





                                 10/15/19 07:26





- FOLLOW UP


Follow Up: 


You were found to have UTI, antibiotics Cipro is prescribed for you to finish 

the treatment course.


Home health PT and home AIDE are arranged for you,  help you 

arrange for long term nurse facility care. 


you may followup your PCP in one week. Should your symptoms return or worsen, 

you may present ER or call [phone] for help








- TIME SPENT


Time Spent in Discharge (Minutes): 55

## 2019-10-15 NOTE — DISCHARGE PLAN
Discharge Plan


Problem Reviewed?: Yes


Disposition: 06 Home Health Service


Condition: Stable


Prescriptions: 


Ciprofloxacin HCl [Cipro] 500 mg PO BID #14 tablet


Diet: Regular


Activity Restrictions: Activity as Tolerated


Shower Restrictions: No (fall precaution, caregiver closely monitor)


Instruction Topics:  Ciprofloxacin tablets, UTI


Health Concerns: 


UTI, weakness





Plan of Treatment: 


You were found to have UTI, antibiotics Cipro is prescribed for you to finish 

the treatment course.


Home health PT and home AIDE are arranged for you,  help you 

arrange for long term nurse facility care. 


Care Goals: 


stabilization and improvement of your medical conditions


Assessment: 


assessment as the above


Additional Instructions or Follow Up instructions: 


you may followup your PCP in one week. Should your symptoms return or worsen, 

you may present ER or call [phone] for help


Follow-Up Care: Home Health - PT


No Smoking: If you smoke, Please STOP!  Call [phone] for help.


Follow-up with: 


Gonzalez Ingram MD [Primary Care Provider] -

## 2019-12-13 NOTE — ED PHYSICIAN DOCUMENTATION
History of Present Illness





- Stated complaint


Stated Complaint: WEAKNESS





- Chief complaint


Chief Complaint: Neuro





- History obtained from


History obtained from: Patient, Family, EMS





- History of Present Illness


Timing: Today


Pain level max: 0


Pain level now: 0





- Additonal information


Additional information: 





EMS states that the patient was altered earlier today and more weak at home.  

She lives at home with her .  She was found to have a blood sugar of 55 

upon EMS arrival.  This is now improved to the 70s.  She is now more awake and 

alert.  No focal neurological deficits.  No abdominal pain.  No vomiting.





Review of Systems


Ten Systems: 10 systems reviewed and negative


Constitutional: denies: Fever, Chills


Nose: denies: Rhinorrhea / runny nose, Congestion


Throat: denies: Sore throat


Cardiac: denies: Chest pain / pressure, Palpitations


Respiratory: denies: Dyspnea, Cough


GI: denies: Abdominal Pain, Nausea, Vomiting, Diarrhea


Skin: denies: Rash


Musculoskeletal: denies: Neck pain, Back pain


Neurologic: reports: Generalized weakness.  denies: Focal weakness, Numbness, 

Seizure, Headache





PD PAST MEDICAL HISTORY





- Past Medical History


Past Medical History: Yes


Cardiovascular: High cholesterol, Coronary artery disease, Peripheral Vascular 

Disease


Respiratory: Asthma


Neuro: Dementia


Endocrine/Autoimmune: None


GI: GERD, Hiatal hernia


GYN: None


: None


HEENT: Glaucoma


Psych: Anxiety


Musculoskeletal: Osteoporosis


Derm: None





- Past Surgical History


Past Surgical History: Yes


General: Appendectomy


Ortho: Amputation, Other


/GYN: Hysterectomy





- Present Medications


Home Medications: 


                                Ambulatory Orders











 Medication  Instructions  Recorded  Confirmed


 


ALPRAZolam [Alprazolam] 0.25 mg PO BID 05/17/16 10/13/19


 


Baclofen 10 mg PO TID PRN 10/12/19 10/13/19


 


Divalproex Sodium 125 mg PO BID 10/12/19 10/13/19


 


Esomeprazole Magnesium 40 mg PO QDAC 10/12/19 10/13/19


 


Lovastatin 80 mg PO QPM 10/12/19 10/13/19


 


Mirtazapine 7.5 mg PO QPM 10/12/19 10/13/19


 


Potassium Chloride 20 meq PO DAILY 10/12/19 10/13/19


 


ALPRAZolam [Alprazolam] 0.25 mg PO BID 10/13/19 10/13/19


 


Aspirin [Aspirin EC] 81 mg PO DAILY 10/13/19 10/13/19


 


Brimonidine 0.2% Ophth Drops 1 drops RIGHTEYE BID 10/13/19 10/13/19





[Alphagan P 0.2% Ophth Drops]   


 


Dorzolamide HCl/Timolol Maleat 1 drops RIGHTEYE BID 10/13/19 10/13/19





[Dorzolamide-Timolol Eye Drops]   


 


Latanoprost/Pf [Latanoprost 0.005% 1 drops EACHEYE QPM 10/13/19 10/13/19





Eye Drop]   


 


Lidocaine Patch 5% [Lidoderm Patch] 2 patch TOP QPM 10/13/19 10/13/19


 


Megestrol Acetate 400 mg PO DAILY 10/13/19 10/14/19


 


Pregabalin 50 mg PO BID PRN 10/13/19 10/13/19


 


Ciprofloxacin HCl [Cipro] 500 mg PO BID #14 tablet 10/15/19 














- Allergies


Allergies/Adverse Reactions: 


                                    Allergies











Allergy/AdvReac Type Severity Reaction Status Date / Time


 


gentamicin [Gentamicin] Allergy  Unknown Verified 12/13/19 12:48


 


lisinopril Allergy  Unknown Verified 12/13/19 12:48














- Social History


Does the pt smoke?: No


Smoking Status: Never smoker


Does the pt drink ETOH?: No


Does the pt have substance abuse?: No





- Immunizations


Immunizations are current?: Yes





- POLST


Patient has POLST: Yes





PD ED PE NORMAL





- Vitals


Vital signs reviewed: Yes





- General


General: Alert and oriented X 3, No acute distress, Well developed/nourished





- HEENT


HEENT: Atraumatic, PERRL, Other (Dry lips)





- Neck


Neck: Supple, no meningeal sign, No bony TTP





- Cardiac


Cardiac: RRR, Strong equal pulses





- Respiratory


Respiratory: No respiratory distress, Clear bilaterally





- Abdomen


Abdomen: Soft, Non tender, Non distended





- Derm


Derm: Warm and dry, No rash





- Extremities


Extremities: Other (Bilateral AKA)





- Neuro


Neuro: Alert and oriented X 3, CNs 2-12 intact, No motor deficit, No sensory 

deficit, Normal speech





- Psych


Psych: Normal mood, Normal affect





Results





- Vitals


Vitals: 


                               Vital Signs - 24 hr











  12/13/19 12/13/19 12/13/19





  12:48 14:19 14:56


 


Temperature 36.4 C L  


 


Heart Rate 74 68 64


 


Respiratory 12 16 16





Rate   


 


Blood Pressure 160/90 H 136/96 H 94/54 L


 


O2 Saturation 94 91 L 98














  12/13/19 12/13/19





  15:05 16:14


 


Temperature 36.5 C 


 


Heart Rate 65 65


 


Respiratory 16 15





Rate  


 


Blood Pressure 131/85 H 147/89 H


 


O2 Saturation 93 96








                                     Oxygen











O2 Source []                   Room air


 


O2 Source                      Room air

















- EKG (time done)


  ** 1304


Rate: Rate (enter#) (74)


Rhythm: NSR


Axis: Normal


Intervals: Normal DE


QRS: Normal


Ischemia: Normal ST segments





- Labs


Labs: 


                                Laboratory Tests











  12/13/19 12/13/19 12/13/19





  13:35 15:22 15:22


 


WBC   6.3 


 


RBC   3.99 L 


 


Hgb   11.1 L 


 


Hct   37.3 


 


MCV   93.5 


 


MCH   27.8 


 


MCHC   29.8 L 


 


RDW   18.6 H 


 


Plt Count   476 H 


 


MPV   11.7 H 


 


Neut # (Auto)   Not Reportable 


 


Lymph # (Auto)   Not Reportable 


 


Mono # (Auto)   Not Reportable 


 


Eos # (Auto)   Not Reportable 


 


Baso # (Auto)   Not Reportable 


 


Absolute Nucleated RBC   Not Reportable 


 


Total Counted   100 


 


Band Neuts % (Manual)   1 


 


Abnorm Lymph % (Manual)   0 


 


Nucleated RBC %   Not Reportable 


 


Neutrophils # (Manual)   2.6 


 


Lymphocytes # (Manual)   3.4 


 


Monocytes # (Manual)   0.2 


 


Eosinophils # (Manual)   0.1 


 


Basophils # (Manual)   0.0 


 


Differential Comment   MANUAL DIFFERENTIAL 


 


Manual Slide Review   Indicated 


 


WBC Morphology   NORMAL APPEARANCE 


 


Platelet Estimate   INCREASED (>450,000) 


 


Platelet Morphology   RARE GIANT PLATELETS 


 


RBC Morph Micro Appear   1+ ANISOCYTOSIS 


 


Sodium    141


 


Potassium    3.1 L


 


Chloride    109


 


Carbon Dioxide    26


 


Anion Gap    6.0


 


BUN    8


 


Creatinine    0.8


 


Estimated GFR (MDRD)    84 L


 


Glucose    113 H


 


Calcium    9.1


 


Total Bilirubin    0.4


 


AST    18


 


ALT    < 10 L


 


Alkaline Phosphatase    83


 


Total Protein    8.0


 


Albumin    2.8 L


 


Globulin    5.2 H


 


Albumin/Globulin Ratio    0.5 L


 


Lipase    30


 


Urine Color  YELLOW  


 


Urine Clarity  CLEAR  


 


Urine pH  6.5  


 


Ur Specific Gravity  1.010  


 


Urine Protein  NEGATIVE  


 


Urine Glucose (UA)  NEGATIVE  


 


Urine Ketones  NEGATIVE  


 


Urine Occult Blood  NEGATIVE  


 


Urine Nitrite  NEGATIVE  


 


Urine Bilirubin  NEGATIVE  


 


Urine Urobilinogen  0.2 (NORMAL)  


 


Ur Leukocyte Esterase  NEGATIVE  


 


Ur Microscopic Review  NOT INDICATED  


 


Urine Culture Comments  NOT INDICATED  


 


Salicylates    < 6.0


 


Acetaminophen    < 10 L


 


Ethyl Alcohol    < 5.0














PD MEDICAL DECISION MAKING





- ED course


Complexity details: reviewed results, re-evaluated patient, considered 

differential, d/w patient, d/w family


ED course: 





Patient's mental status and weakness improved with correction of her 

hypoglycemia.  Also given IV fluids.  No significant lab abnormalities.  She has

 not been ill.  No coughing.  No vomiting.  No head injury.  No focal 

neurological deficits.  No evidence of stroke.  Patient would like to go home at

 this time.  Patient and family counseled regarding signs and symptoms for which

 I believe and urgent re-evaluation would be necessary. Patient with good 

understanding of and agreement to plan and is comfortable going home at this 

time





This document was made in part using voice recognition software. While efforts 

are made to proofread this document, sound alike and grammatical errors may 

occur.





Departure





- Departure


Disposition: 01 Home, Self Care


Clinical Impression: 


 Hypoglycemia





Condition: Good


Instructions:  ED Blood Sugar Low Non Diabetic


Follow-Up: 


Gonzalez Ingram MD [Primary Care Provider] - Within 1 week


Comments: 


Make sure that she is eating and drinking regularly at home.  Her testing does 

not reveal any acute abnormalities today other than a low blood sugar.


Discharge Date/Time: 12/13/19 16:34